# Patient Record
Sex: MALE
[De-identification: names, ages, dates, MRNs, and addresses within clinical notes are randomized per-mention and may not be internally consistent; named-entity substitution may affect disease eponyms.]

---

## 2023-01-11 ENCOUNTER — NURSE TRIAGE (OUTPATIENT)
Dept: OTHER | Facility: CLINIC | Age: 68
End: 2023-01-11

## 2023-01-11 NOTE — TELEPHONE ENCOUNTER
Reason for Disposition   Caller has medicine question only, adult not sick, and triager answers question    Protocols used: Medication Question Call-ADULT-[place]    Patient is calling in to report he does not have his AVS after being seen in the ED. Caller is asking if he needs to take food with antibiotics. Answered question for patient. Also showed caller how to access his [employer] account so that he can see his medical chart.  Advised to call back with any further needs